# Patient Record
Sex: FEMALE | Race: WHITE | NOT HISPANIC OR LATINO | Employment: OTHER | URBAN - NONMETROPOLITAN AREA
[De-identification: names, ages, dates, MRNs, and addresses within clinical notes are randomized per-mention and may not be internally consistent; named-entity substitution may affect disease eponyms.]

---

## 2023-05-01 PROBLEM — R53.83 FATIGUE: Status: ACTIVE | Noted: 2023-05-01

## 2023-05-01 PROBLEM — M54.12 CERVICAL RADICULITIS: Status: ACTIVE | Noted: 2023-05-01

## 2023-05-01 PROBLEM — E78.5 HYPERLIPIDEMIA: Status: ACTIVE | Noted: 2023-05-01

## 2023-05-01 PROBLEM — R91.1 LUNG NODULE: Status: ACTIVE | Noted: 2023-05-01

## 2023-05-01 RX ORDER — NIACINAMIDE 500 MG
TABLET, EXTENDED RELEASE ORAL
COMMUNITY
End: 2023-10-23

## 2023-05-01 RX ORDER — VITAMIN E MIXED 400 UNIT
CAPSULE ORAL
COMMUNITY
Start: 2021-07-23

## 2023-05-01 RX ORDER — ACETAMINOPHEN 500 MG
TABLET ORAL
COMMUNITY
End: 2023-12-04 | Stop reason: WASHOUT

## 2023-05-01 RX ORDER — BUTYROSPERMUM PARKII(SHEA BUTTER), SIMMONDSIA CHINENSIS (JOJOBA) SEED OIL, ALOE BARBADENSIS LEAF EXTRACT .01; 1; 3.5 G/100G; G/100G; G/100G
LIQUID TOPICAL
COMMUNITY
End: 2023-05-03 | Stop reason: ALTCHOICE

## 2023-05-02 ASSESSMENT — PROMIS GLOBAL HEALTH SCALE
RATE_PHYSICAL_HEALTH: GOOD
RATE_GENERAL_HEALTH: VERY GOOD
RATE_AVERAGE_PAIN: 0
EMOTIONAL_PROBLEMS: RARELY
RATE_QUALITY_OF_LIFE: VERY GOOD
CARRYOUT_PHYSICAL_ACTIVITIES: COMPLETELY
RATE_SOCIAL_SATISFACTION: VERY GOOD
CARRYOUT_SOCIAL_ACTIVITIES: VERY GOOD
RATE_MENTAL_HEALTH: VERY GOOD
RATE_AVERAGE_FATIGUE: MODERATE

## 2023-05-03 ENCOUNTER — OFFICE VISIT (OUTPATIENT)
Dept: PRIMARY CARE | Facility: CLINIC | Age: 58
End: 2023-05-03
Payer: COMMERCIAL

## 2023-05-03 VITALS
SYSTOLIC BLOOD PRESSURE: 105 MMHG | BODY MASS INDEX: 23.58 KG/M2 | RESPIRATION RATE: 12 BRPM | DIASTOLIC BLOOD PRESSURE: 77 MMHG | TEMPERATURE: 97.8 F | OXYGEN SATURATION: 97 % | HEART RATE: 68 BPM | WEIGHT: 133.1 LBS | HEIGHT: 63 IN

## 2023-05-03 DIAGNOSIS — Z11.59 NEED FOR HEPATITIS B SCREENING TEST: ICD-10-CM

## 2023-05-03 DIAGNOSIS — Z00.00 WELLNESS EXAMINATION: ICD-10-CM

## 2023-05-03 DIAGNOSIS — E78.00 ELEVATED LDL CHOLESTEROL LEVEL: Primary | ICD-10-CM

## 2023-05-03 PROBLEM — Z87.891 HISTORY OF PRIOR CIGARETTE SMOKING: Status: ACTIVE | Noted: 2023-05-03

## 2023-05-03 PROCEDURE — 99396 PREV VISIT EST AGE 40-64: CPT | Performed by: FAMILY MEDICINE

## 2023-05-03 PROCEDURE — 1036F TOBACCO NON-USER: CPT | Performed by: FAMILY MEDICINE

## 2023-05-03 NOTE — ASSESSMENT & PLAN NOTE
Currently managing with lifestyle measures.  Ordered lipid panel.  Expectation that cholesterol levels have improved with recent weight loss efforts.

## 2023-05-03 NOTE — PROGRESS NOTES
"Subjective   Patient ID: Donna Alcantara is a 57 y.o. female who presents for annual physical/wellness visit.    She signed document informing that if problem issues also address at today's wellness visit that insurance may be appropriately billed so co-pay and deductible out of pocket expenses may occur.    Colorectal cancer screen: Due at 59  Mammogram: 11/28/2022  Pap:03/21/2022   Last Menstrual Period: unknown   Tdap: 05/29/2018   HIV screen:  Hepatitis C screen: 03/21/2017  Hepatitis B vaccine:    HPI   Patient has been losing weight through diet and exercise. Her goal weight Is 125 lbs. Patient repots having episodes of lightheadedness during light exercise such as walking. During these episdoes her HR would spike to high 100s BPM, before shortly returning to normal HR. At the time she was having episodes about twice per week. She has not had an episode like this for the past 6 months. Patient denies any change to caffeine consumption.    Patient follows with optometrist and dentist regularly. She wears contacts. Denies hearing loss.     Patient quit smoking in 2005.    Review of Systems  constitutional: as per HPI  eyes:as per HPI  CV:as per HPI  Respiratory:as per HPI  GI:as per HPI  neuro:as per HPI  endo:as per HPI  heme/lymph:as per HPI     Objective   /77 (BP Location: Right arm, Patient Position: Sitting, BP Cuff Size: Adult)   Pulse 68   Temp 36.6 °C (97.8 °F) (Temporal)   Resp 12   Ht 1.6 m (5' 3\")   Wt 60.4 kg (133 lb 1.6 oz)   LMP  (LMP Unknown)   SpO2 97%   BMI 23.58 kg/m²     Physical Exam  Constitutional:       Appearance: Normal appearance. She is normal weight.   HENT:      Head: Normocephalic.      Right Ear: Tympanic membrane, ear canal and external ear normal.      Left Ear: Tympanic membrane, ear canal and external ear normal.   Eyes:      Conjunctiva/sclera: Conjunctivae normal.      Pupils: Pupils are equal, round, and reactive to light.   Cardiovascular:      Rate and Rhythm: " "Normal rate and regular rhythm.   Pulmonary:      Effort: Pulmonary effort is normal.      Breath sounds: Normal breath sounds.   Abdominal:      General: Abdomen is flat. Bowel sounds are normal.      Palpations: Abdomen is soft.   Musculoskeletal:         General: Normal range of motion.   Skin:     General: Skin is warm.   Neurological:      Mental Status: She is alert and oriented to person, place, and time.   Psychiatric:         Mood and Affect: Mood normal.         Behavior: Behavior normal.         Thought Content: Thought content normal.         Judgment: Judgment normal.         Assessment/Plan   Problem List Items Addressed This Visit          Other    Elevated LDL cholesterol level - Primary     Currently managing with lifestyle measures.  Ordered lipid panel.  Expectation that cholesterol levels have improved with recent weight loss efforts.          Other Visit Diagnoses       Wellness examination        Colonoscopy up to date (due at age 59).  Pap is up to date (due in 2025).  Ordered general wellness blood work.    Relevant Orders    CBC    Lipid Panel    Comprehensive Metabolic Panel    Need for hepatitis B screening test        Relevant Orders    Hepatitis B surface antibody          Aim for 1500 mg of calcium daily through diet. Avoid calcium supplementation due to increase cardiovascular risk.    Tips for your general wellness...;  Remember importance of daily aerobic exercise for 30minutes to help with both your physical and mental/emotional health.  ;  Take time twice a day to relax with focused breathing and relaxation.  A good source to learn \"mindfulness\" relaxation is a book \"Mindfulness for Beginners\" by Lan Sumner.  ;    Strive for healthy eating with plenty of water, fruits, vegetables, and choosing as much plant based diet as able  If do consume non plant protein, choose fish high in omega (like salmon or cod), skinless poultry, and rarely anything from a cow  Avoid processed " foods.  ;  Shop the perimeter of the grocery store  - not the inner aisles where all the boxed, bagged, and canned process non natural foods are   Avoid sugar - including fruit juices with added sugar and avoid artificial sweeteners (sucralose, aspartame).; if want to use sweetener, use stevia (natural plant based non caloric sweetener)  Recommended guided nutrition plans include Mediterranean Diet - online resources available     Get plenty of sleep nightly - 7 hours minimum;    Exercise 150min per week;    Do not use tobacco    Abstain or limit alcohol to 1-2 drinks per 24 hours    See your dentist at least yearly    Have an eye check at least every 5 years    Follow up 1 year for annual wellness checkup;    By signing my name below I, ashley Crook, attest that this documentation has been prepared under the direction and in the presence of Dr. Brandan Kaplan. 05/03/23

## 2023-05-04 ENCOUNTER — LAB (OUTPATIENT)
Dept: LAB | Facility: LAB | Age: 58
End: 2023-05-04
Payer: COMMERCIAL

## 2023-05-04 DIAGNOSIS — Z11.59 NEED FOR HEPATITIS B SCREENING TEST: ICD-10-CM

## 2023-05-04 DIAGNOSIS — Z00.00 WELLNESS EXAMINATION: ICD-10-CM

## 2023-05-04 LAB
ALANINE AMINOTRANSFERASE (SGPT) (U/L) IN SER/PLAS: 15 U/L (ref 7–45)
ALBUMIN (G/DL) IN SER/PLAS: 4.3 G/DL (ref 3.4–5)
ALKALINE PHOSPHATASE (U/L) IN SER/PLAS: 71 U/L (ref 33–110)
ANION GAP IN SER/PLAS: 12 MMOL/L (ref 10–20)
ASPARTATE AMINOTRANSFERASE (SGOT) (U/L) IN SER/PLAS: 14 U/L (ref 9–39)
BILIRUBIN TOTAL (MG/DL) IN SER/PLAS: 0.3 MG/DL (ref 0–1.2)
CALCIUM (MG/DL) IN SER/PLAS: 9.7 MG/DL (ref 8.6–10.6)
CARBON DIOXIDE, TOTAL (MMOL/L) IN SER/PLAS: 29 MMOL/L (ref 21–32)
CHLORIDE (MMOL/L) IN SER/PLAS: 105 MMOL/L (ref 98–107)
CHOLESTEROL (MG/DL) IN SER/PLAS: 222 MG/DL (ref 0–199)
CHOLESTEROL IN HDL (MG/DL) IN SER/PLAS: 58.1 MG/DL
CHOLESTEROL/HDL RATIO: 3.8
CREATININE (MG/DL) IN SER/PLAS: 0.69 MG/DL (ref 0.5–1.05)
ERYTHROCYTE DISTRIBUTION WIDTH (RATIO) BY AUTOMATED COUNT: 14.2 % (ref 11.5–14.5)
ERYTHROCYTE MEAN CORPUSCULAR HEMOGLOBIN CONCENTRATION (G/DL) BY AUTOMATED: 31.5 G/DL (ref 32–36)
ERYTHROCYTE MEAN CORPUSCULAR VOLUME (FL) BY AUTOMATED COUNT: 89 FL (ref 80–100)
ERYTHROCYTES (10*6/UL) IN BLOOD BY AUTOMATED COUNT: 4.65 X10E12/L (ref 4–5.2)
GFR FEMALE: >90 ML/MIN/1.73M2
GLUCOSE (MG/DL) IN SER/PLAS: 89 MG/DL (ref 74–99)
HEMATOCRIT (%) IN BLOOD BY AUTOMATED COUNT: 41.3 % (ref 36–46)
HEMOGLOBIN (G/DL) IN BLOOD: 13 G/DL (ref 12–16)
HEPATITIS B VIRUS SURFACE AB (MIU/ML) IN SERUM: 23.5 MIU/ML
LDL: 147 MG/DL (ref 0–99)
LEUKOCYTES (10*3/UL) IN BLOOD BY AUTOMATED COUNT: 5.2 X10E9/L (ref 4.4–11.3)
NRBC (PER 100 WBCS) BY AUTOMATED COUNT: 0 /100 WBC (ref 0–0)
PLATELETS (10*3/UL) IN BLOOD AUTOMATED COUNT: 275 X10E9/L (ref 150–450)
POTASSIUM (MMOL/L) IN SER/PLAS: 4.9 MMOL/L (ref 3.5–5.3)
PROTEIN TOTAL: 6.5 G/DL (ref 6.4–8.2)
SODIUM (MMOL/L) IN SER/PLAS: 141 MMOL/L (ref 136–145)
TRIGLYCERIDE (MG/DL) IN SER/PLAS: 83 MG/DL (ref 0–149)
UREA NITROGEN (MG/DL) IN SER/PLAS: 15 MG/DL (ref 6–23)
VLDL: 17 MG/DL (ref 0–40)

## 2023-05-04 PROCEDURE — 86706 HEP B SURFACE ANTIBODY: CPT

## 2023-05-04 PROCEDURE — 80061 LIPID PANEL: CPT

## 2023-05-04 PROCEDURE — 80053 COMPREHEN METABOLIC PANEL: CPT

## 2023-05-04 PROCEDURE — 85027 COMPLETE CBC AUTOMATED: CPT

## 2023-05-04 PROCEDURE — 36415 COLL VENOUS BLD VENIPUNCTURE: CPT

## 2023-10-23 ENCOUNTER — OFFICE VISIT (OUTPATIENT)
Dept: OBSTETRICS AND GYNECOLOGY | Facility: CLINIC | Age: 58
End: 2023-10-23
Payer: COMMERCIAL

## 2023-10-23 VITALS
DIASTOLIC BLOOD PRESSURE: 74 MMHG | SYSTOLIC BLOOD PRESSURE: 121 MMHG | HEIGHT: 63 IN | BODY MASS INDEX: 24.27 KG/M2 | WEIGHT: 137 LBS

## 2023-10-23 DIAGNOSIS — Z01.419 WELL WOMAN EXAM: Primary | ICD-10-CM

## 2023-10-23 PROCEDURE — 99396 PREV VISIT EST AGE 40-64: CPT | Performed by: NURSE PRACTITIONER

## 2023-10-23 PROCEDURE — 1036F TOBACCO NON-USER: CPT | Performed by: NURSE PRACTITIONER

## 2023-10-23 ASSESSMENT — ENCOUNTER SYMPTOMS
GASTROINTESTINAL NEGATIVE: 1
CONSTITUTIONAL NEGATIVE: 1
EYES NEGATIVE: 1
NEUROLOGICAL NEGATIVE: 1
CARDIOVASCULAR NEGATIVE: 1
RESPIRATORY NEGATIVE: 1
ENDOCRINE NEGATIVE: 1
HEMATOLOGIC/LYMPHATIC NEGATIVE: 1
PSYCHIATRIC NEGATIVE: 1
ALLERGIC/IMMUNOLOGIC NEGATIVE: 1
MUSCULOSKELETAL NEGATIVE: 1

## 2023-10-23 ASSESSMENT — PAIN SCALES - GENERAL: PAINLEVEL: 0-NO PAIN
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
## 2023-11-29 NOTE — PROGRESS NOTES
Donna Alcantara female   1965 58 y.o.   [id number]      Chief Complaint  High risk evaluation    History Of Present Illness  Donna Alcantara is a very pleasant 58 year old  woman [facility]. She has family history of breast cancer in her mother, age 53, BRCA negative, sister, age 56 and paternal aunt. She had a bilateral breast reduction and lift in 2022, recovered well. She is pleased with the outcome. She has a history of right breast core biopsy, benign in 2018 and history of left breast core biopsy in 2023, benign and concordant. The biopsy in September was prompted by MRI, short term MRI follow up recommended.      BREAST IMAGIN2022 Bilateral screening mammogram, indicates BI-RADS Category 1.      FEMALE HISTORY: menarche age 13, nulliparous, OCP's x 35 years, natural menopause age 49, no HRT, scattered fibroglandular tissue     FAMILY CANCER HISTORY:  Mother: Breast cancer, age 53, negative genetics  Sister: Breast cancer, age 56, NO genetics   Paternal Aunt (1): Breast cancer  Paternal Aunt (2): Ovarian cancer     Surgical History  She has a past surgical history that includes Tonsillectomy (2017); Appendectomy (2017); Breast surgery (2022); Bland tooth extraction; Skin biopsy (Right); Breast biopsy (2023); and Reduction mammaplasty (12).     Social History  She reports that she quit smoking about 18 years ago. Her smoking use included cigarettes. She has a 15.00 pack-year smoking history. She has never used smokeless tobacco. She reports current alcohol use of about 8.0 standard drinks of alcohol per week. She reports that she does not use drugs.    Family History  Family History   Problem Relation Name Age of Onset    Hypertension Mother Ramandeep     Breast cancer Mother Ramandeep     Uterine cancer Mother Ramandeep     Osteoporosis Mother Ramandeep     Arthritis Mother Ramandeep     Breast cancer Sister Pauline hernandez      Alcohol abuse Mother's Brother Michael     Colon cancer Mother's Brother Michael     Diabetes Maternal Grandmother Grandma     Breast cancer Maternal Cousin          Allergies  Erythromycin base    Medications  Current Outpatient Medications   Medication Instructions    calcium carbonate (CALCIUM 500 ORAL) oral    cholecalciferol (Vitamin D-3) 50 mcg (2,000 unit) capsule oral    glucosam-chondroitin-diet cb25 116-100 mg capsule oral    vitamin E 180 mg (400 unit) capsule oral, Daily RT         REVIEW OF SYSTEMS    Constitutional:  Negative for appetite change, fatigue, fever and unexpected weight change.   HENT:  Negative for ear pain, hearing loss, nosebleeds, sore throat and trouble swallowing.    Eyes:  Negative for discharge, itching and visual disturbance.   Respiratory:  Negative for cough, chest tightness and shortness of breath.    Cardiovascular:  Negative for chest pain, palpitations and leg swelling.   Breast: as indicated in HPI  Gastrointestinal:  Negative for abdominal pain, constipation, diarrhea and nausea.   Endocrine: Negative for cold intolerance and heat intolerance.   Genitourinary:  Negative for dysuria, frequency, hematuria, pelvic pain and vaginal bleeding.   Musculoskeletal:  Negative for arthralgias, back pain, gait problem, joint swelling and myalgias.   Skin:  Negative for color change and rash.   Allergic/Immunologic: Negative for environmental allergies and food allergies.   Neurological:  Negative for dizziness, tremors, speech difficulty, weakness, numbness and headaches.   Hematological:  Does not bruise/bleed easily.   Psychiatric/Behavioral:  Negative for agitation, dysphoric mood and sleep disturbance. The patient is not nervous/anxious.         Past Medical History  She has a past medical history of Abnormal findings on diagnostic imaging of other specified body structures (04/16/2020), Acute eczematoid otitis externa, unspecified ear (12/03/2018), Allergic, Anemia, Arthritis,  Contact with and (suspected) exposure to covid-19 (12/08/2020), Encounter for immunization (03/29/2018), Encounter for immunization (12/26/2019), Encounter for other screening for malignant neoplasm of breast (05/29/2018), Encounter for screening for malignant neoplasm of cervix (05/29/2018), Encounter for screening for other viral diseases, Fibrocystic breast, Low back pain, unspecified (07/23/2021), Other abnormal and inconclusive findings on diagnostic imaging of breast (12/05/2018), Other specified disorders of eustachian tube, right ear (12/03/2018), Other specified disorders of nose and nasal sinuses (03/22/2017), Other specified noninflammatory disorders of vagina (06/20/2019), Pain in right shoulder (11/12/2020), Peptic ulceration, Personal history of other benign neoplasm (03/21/2017), Personal history of other diseases of the musculoskeletal system and connective tissue (03/21/2017), Personal history of other diseases of the musculoskeletal system and connective tissue (10/16/2019), Personal history of other diseases of urinary system (01/17/2020), Personal history of other medical treatment (07/10/2019), Personal history of other specified conditions (02/15/2021), Personal history of other specified conditions (07/23/2021), Personal history of other specified conditions (03/29/2018), Personal history of other specified conditions (01/23/2020), Personal history of pneumonia (recurrent) (12/26/2019), Personal history of urinary (tract) infections (11/02/2017), Postmenopausal atrophic vaginitis (03/31/2022), Psoriasis, unspecified (03/21/2017), Sprain of left acromioclavicular joint, initial encounter (01/17/2020), Unspecified injury of muscle(s) and tendon(s) of the rotator cuff of right shoulder, initial encounter (09/29/2020), Unspecified sprain of unspecified finger, initial encounter (01/17/2020), and Varicella.     Physical Exam  Patient is alert and oriented x3 and in a relaxed and appropriate mood.  Her gait is steady and hand grasps are equal. Sclera is clear. The breasts are nearly symmetrical. The tissue is dense in the superior lateral quadrants and softer below without palpable abnormalities, discrete nodules or masses. Both breasts have well-healed lollipop incisions. The skin and nipples appear normal. There is no cervical, supraclavicular or axillary lymphadenopathy. Heart rate and rhythm normal, S1 and S2 appreciated. The lungs are clear to auscultation bilaterally. Abdomen is soft and non-tender.       Physical Exam  Chest:              Last Recorded Vitals  Vitals:    12/04/23 1134   BP: 125/86   Pulse: 68       Relevant Results   Time was spent viewing digital images of the radiology testing with the patient. I explained the results in depth, along with suggested explanation for follow up recommendations based on the testing results. BI-RADS Category     Imaging     Narrative & Impression   Interpreted By:  Chyna Krause and Avery Ross   STUDY:  BI MAMMO BILATERAL SCREENING TOMOSYNTHESIS;  12/4/2023 11:10 am      ACCESSION NUMBER(S):  [id number]      ORDERING CLINICIAN:  ARIEL MA      INDICATION:  Screening. Bilateral reduction. Benign bilateral breast biopsies.  Strong family history of breast cancer.      COMPARISON:  09/06/2023, 11/28/2022, 11/01/2021, 10/15/2020.      FINDINGS:  2D and tomosynthesis images were reviewed at 1 mm slice thickness.      Density:  There are areas of scattered fibroglandular tissue.      Tissue markers are identified in the inferolateral right breast at  posterior depth and superolateral left breast at middle depth.  Bilateral reduction changes are noted. No suspicious masses or  calcifications are identified.      IMPRESSION:  No mammographic evidence of malignancy.      BI-RADS CATEGORY:  BI-RADS Category:  2 Benign.  Recommendation:  Routine Screening Mammogram in 1 Year.  Recommended Date:  1 Year.  Laterality:  Bilateral.       Assessment/Plan    High risk surveillance care, normal clinical exam and imaging, family history of breast cancer, scattered fibroglandular tissue, history of bilateral core biopsies, benign, history of bilateral breast reduction and lift    Plan: Return in one year for bilateral screening mammogram and office visit. Full MRI in March 2024. Endocrine therapy discussed and recommended. She would like to consider her options and contact office if decided.     Patient Discussion/Summary  Your clinical examination and imaging are normal. Please return in one year for bilateral screening mammogram and office visit or sooner if you have any problems or concerns.     High risk breast surveillance care plan:  Yearly mammogram with digital breast tomosynthesis  Twice yearly clinical breast examinations  Breast MRI- Full MRI in March 2024  Monthly self breast examinations  Vitamin D3 2000 IU/daily (over the counter)  Exercise 3-4 times per week for 45-60 minutes  Limit alcohol to 3-4 drinks per week   Eat a healthy low-fat diet with lots of fruits and vegetables  Risk models indicate personal risk of breast cancer in the next five years and lifetime (age 90)  Breast Cancer Risk Assessment Tool (Una): 5 year risk 6.3% (average 1.7%) and lifetime risk 31.4% (average 9.5%)  Gita-Mark Anthonyck: 5 year risk 3.1% (average 1.6%) and lifetime risk 14.9% (average 7.7%)   Risk model indicates you are eligible for endocrine therapy with Tamoxifen, Raloxifene or Exemestane. Endocrine therapy reduces lifetime risk of breast cancer by 50% when taken for 5 years.    You can see your health information, review clinical summaries from office visits & test results online when you follow your health with MY  Chart, a personal health record. To sign up go to [email or web address]. If you need assistance with signing up or trouble getting into your account call [facility] Patient Line 24/7 at [phone].    My office phone number is [phone] if you need to  get in touch with me or have additional questions or concerns. Thank you for choosing [facility] and trusting me as your healthcare provider. I look forward to seeing you again at your next office visit. I am honored to be a provider on your health care team and I remain dedicated to helping you achieve your health goals.      Krystal Parham, APRN-CNP

## 2023-12-04 ENCOUNTER — ANCILLARY PROCEDURE (OUTPATIENT)
Dept: RADIOLOGY | Facility: CLINIC | Age: 58
End: 2023-12-04
Payer: COMMERCIAL

## 2023-12-04 ENCOUNTER — OFFICE VISIT (OUTPATIENT)
Dept: SURGICAL ONCOLOGY | Facility: CLINIC | Age: 58
End: 2023-12-04
Payer: COMMERCIAL

## 2023-12-04 VITALS
WEIGHT: 130 LBS | DIASTOLIC BLOOD PRESSURE: 86 MMHG | HEART RATE: 68 BPM | SYSTOLIC BLOOD PRESSURE: 125 MMHG | BODY MASS INDEX: 23.04 KG/M2 | HEIGHT: 63 IN

## 2023-12-04 DIAGNOSIS — Z12.31 ENCOUNTER FOR SCREENING MAMMOGRAM FOR MALIGNANT NEOPLASM OF BREAST: ICD-10-CM

## 2023-12-04 DIAGNOSIS — Z12.39 BREAST CANCER SCREENING, HIGH RISK PATIENT: Primary | ICD-10-CM

## 2023-12-04 PROCEDURE — 1036F TOBACCO NON-USER: CPT | Performed by: NURSE PRACTITIONER

## 2023-12-04 PROCEDURE — 77063 BREAST TOMOSYNTHESIS BI: CPT | Mod: BILATERAL PROCEDURE | Performed by: STUDENT IN AN ORGANIZED HEALTH CARE EDUCATION/TRAINING PROGRAM

## 2023-12-04 PROCEDURE — 99214 OFFICE O/P EST MOD 30 MIN: CPT | Performed by: NURSE PRACTITIONER

## 2023-12-04 PROCEDURE — 77067 SCR MAMMO BI INCL CAD: CPT | Mod: BILATERAL PROCEDURE | Performed by: STUDENT IN AN ORGANIZED HEALTH CARE EDUCATION/TRAINING PROGRAM

## 2023-12-04 PROCEDURE — 77063 BREAST TOMOSYNTHESIS BI: CPT

## 2023-12-04 ASSESSMENT — PAIN SCALES - GENERAL: PAINLEVEL: 0-NO PAIN

## 2023-12-04 NOTE — PATIENT INSTRUCTIONS
Your clinical examination and imaging are normal. Please return in one year for bilateral screening mammogram and office visit or sooner if you have any problems or concerns.     High risk breast surveillance care plan:  Yearly mammogram with digital breast tomosynthesis  Twice yearly clinical breast examinations  Breast MRI- Full MRI in March 2024  Monthly self breast examinations  Vitamin D3 2000 IU/daily (over the counter)  Exercise 3-4 times per week for 45-60 minutes  Limit alcohol to 3-4 drinks per week   Eat a healthy low-fat diet with lots of fruits and vegetables  Risk models indicate personal risk of breast cancer in the next five years and lifetime (age 90)  Breast Cancer Risk Assessment Tool (Una): 5 year risk 6.3% (average 1.7%) and lifetime risk 31.4% (average 9.5%)  Chucker-Diane: 5 year risk 3.1% (average 1.6%) and lifetime risk 14.9% (average 7.7%)   Risk model indicates you are eligible for endocrine therapy with Tamoxifen, Raloxifene or Exemestane. Endocrine therapy reduces lifetime risk of breast cancer by 50% when taken for 5 years.    You can see your health information, review clinical summaries from office visits & test results online when you follow your health with MY  Chart, a personal health record. To sign up go to [email or web address]. If you need assistance with signing up or trouble getting into your account call [employer] Patient Line 24/7 at [phone].    My office phone number is [phone] if you need to get in touch with me or have additional questions or concerns. Thank you for choosing [facility] and trusting me as your healthcare provider. I look forward to seeing you again at your next office visit. I am honored to be a provider on your health care team and I remain dedicated to helping you achieve your health goals.

## 2024-01-17 ENCOUNTER — OFFICE VISIT (OUTPATIENT)
Dept: PRIMARY CARE | Facility: CLINIC | Age: 59
End: 2024-01-17
Payer: COMMERCIAL

## 2024-01-17 VITALS
OXYGEN SATURATION: 96 % | HEART RATE: 73 BPM | BODY MASS INDEX: 24.09 KG/M2 | DIASTOLIC BLOOD PRESSURE: 75 MMHG | WEIGHT: 136 LBS | TEMPERATURE: 97.3 F | SYSTOLIC BLOOD PRESSURE: 113 MMHG

## 2024-01-17 DIAGNOSIS — R07.81 RIB PAIN ON LEFT SIDE: Primary | ICD-10-CM

## 2024-01-17 PROCEDURE — 99213 OFFICE O/P EST LOW 20 MIN: CPT | Performed by: FAMILY MEDICINE

## 2024-01-17 PROCEDURE — 1036F TOBACCO NON-USER: CPT | Performed by: FAMILY MEDICINE

## 2024-01-17 NOTE — PROGRESS NOTES
Subjective   Patient ID: Donna Alcantara is a 58 y.o. female who presents for Mass (Lump on left side below ribs. Posterior. Pt notice 3 days ago, and feels like it has grown since then. Doesn't cause pt any pain at this time. Pt has history of cysts, but states that this is harder than any other cyst she has had ).  HPI    Was applying lotion to her side and noticed lump in back .  Firm .  Wondering if it is a cyst.  Not otherwise sxic     Remote smoker   No kidney pain. No uti sx.s no fever, no illness. Has had a slight cough from sinus drng, but not harsh coughing.     No trauma, falls, or other injuries   Review of Systems    Objective   /75 (BP Location: Right arm, Patient Position: Sitting, BP Cuff Size: Adult)   Pulse 73   Temp 36.3 °C (97.3 °F) (Temporal)   Wt 61.7 kg (136 lb)   LMP  (LMP Unknown)   SpO2 96%   BMI 24.09 kg/m²     Physical Exam  Musculoskeletal:        Back:        Alert. No distress    Lungs clear  CV regular  No CVA pain     No distinct cyst or mass . She has pain on palpation of left back   No crepitus/ step off.   With light touch, there is a depression in the soft tissue and just adjacent to that is a firmness.  She indicates the firmness as the 'lump'  The depression is the intercostal space and the firmness/ lump is  a rib .   Assessment/Plan   Problem List Items Addressed This Visit    None  Visit Diagnoses       Rib pain on left side    -  Primary    Relevant Orders    XR ribs 2 views left w chest pa or ap          Palpable lump = rib, lower/ floating.  Mild inflammation of rib , unclear reason .    Reassurance.    It is normal that it may be sore from exam today/ repeat checking/ palpation.   Recommend  avoid pushing/ pressing. If not gone by next week, get xray.     MOHIT Hickey MD

## 2024-07-17 ENCOUNTER — TELEPHONE (OUTPATIENT)
Dept: SURGICAL ONCOLOGY | Facility: HOSPITAL | Age: 59
End: 2024-07-17
Payer: COMMERCIAL

## 2024-07-17 NOTE — TELEPHONE ENCOUNTER
Pt called wanting a Fast MRI put in due to her $6000/deductible.  Instead of the Full MRI.  Order entered.

## 2024-07-23 ENCOUNTER — HOSPITAL ENCOUNTER (OUTPATIENT)
Dept: RADIOLOGY | Facility: HOSPITAL | Age: 59
Discharge: HOME | End: 2024-07-23

## 2024-07-23 DIAGNOSIS — Z91.89 AT HIGH RISK FOR BREAST CANCER: ICD-10-CM

## 2024-07-23 PROCEDURE — 2550000001 HC RX 255 CONTRASTS: Performed by: NURSE PRACTITIONER

## 2024-07-23 PROCEDURE — 6100000003 BI MR BREAST BILATERAL WITH CONTRAST FAST SCREENING SELF PAY

## 2024-07-23 PROCEDURE — A9575 INJ GADOTERATE MEGLUMI 0.1ML: HCPCS | Performed by: NURSE PRACTITIONER

## 2024-07-23 RX ORDER — GADOTERATE MEGLUMINE 376.9 MG/ML
20 INJECTION INTRAVENOUS
Status: COMPLETED | OUTPATIENT
Start: 2024-07-23 | End: 2024-07-23

## 2024-07-31 ENCOUNTER — APPOINTMENT (OUTPATIENT)
Dept: PRIMARY CARE | Facility: CLINIC | Age: 59
End: 2024-07-31
Payer: COMMERCIAL

## 2024-07-31 VITALS
OXYGEN SATURATION: 96 % | SYSTOLIC BLOOD PRESSURE: 99 MMHG | TEMPERATURE: 96.8 F | BODY MASS INDEX: 24.69 KG/M2 | WEIGHT: 139.4 LBS | DIASTOLIC BLOOD PRESSURE: 63 MMHG | HEART RATE: 72 BPM

## 2024-07-31 DIAGNOSIS — Z00.00 WELLNESS EXAMINATION: ICD-10-CM

## 2024-07-31 DIAGNOSIS — K29.00 ACUTE GASTRITIS WITHOUT HEMORRHAGE, UNSPECIFIED GASTRITIS TYPE: ICD-10-CM

## 2024-07-31 DIAGNOSIS — E78.00 ELEVATED LDL CHOLESTEROL LEVEL: Primary | ICD-10-CM

## 2024-07-31 DIAGNOSIS — Z12.12 SCREENING FOR COLORECTAL CANCER: ICD-10-CM

## 2024-07-31 DIAGNOSIS — Z12.11 SCREENING FOR COLORECTAL CANCER: ICD-10-CM

## 2024-07-31 DIAGNOSIS — E55.9 VITAMIN D DEFICIENCY: ICD-10-CM

## 2024-07-31 PROBLEM — R50.9 FEVER: Status: ACTIVE | Noted: 2019-12-24

## 2024-07-31 PROBLEM — R07.9 CHEST PAIN: Status: RESOLVED | Noted: 2019-12-24 | Resolved: 2024-07-31

## 2024-07-31 PROBLEM — R92.8 ABNORMAL MRI, BREAST: Status: ACTIVE | Noted: 2024-07-31

## 2024-07-31 PROBLEM — E78.5 HYPERLIPIDEMIA: Status: RESOLVED | Noted: 2024-07-31 | Resolved: 2024-07-31

## 2024-07-31 PROBLEM — M54.12 CERVICAL RADICULITIS: Status: RESOLVED | Noted: 2023-05-01 | Resolved: 2024-07-31

## 2024-07-31 PROBLEM — J18.9 PNEUMONIA: Status: RESOLVED | Noted: 2019-12-24 | Resolved: 2024-07-31

## 2024-07-31 PROBLEM — E78.5 HYPERLIPIDEMIA: Status: ACTIVE | Noted: 2024-07-31

## 2024-07-31 PROBLEM — R10.13 EPIGASTRIC PAIN: Status: RESOLVED | Noted: 2024-07-31 | Resolved: 2024-07-31

## 2024-07-31 PROBLEM — J18.9 PNEUMONIA: Status: ACTIVE | Noted: 2019-12-24

## 2024-07-31 PROBLEM — R53.83 FATIGUE: Status: RESOLVED | Noted: 2023-05-01 | Resolved: 2024-07-31

## 2024-07-31 PROBLEM — R50.9 FEVER: Status: RESOLVED | Noted: 2019-12-24 | Resolved: 2024-07-31

## 2024-07-31 PROBLEM — R07.9 CHEST PAIN: Status: ACTIVE | Noted: 2019-12-24

## 2024-07-31 PROBLEM — R10.13 EPIGASTRIC PAIN: Status: ACTIVE | Noted: 2024-07-31

## 2024-07-31 PROCEDURE — 99396 PREV VISIT EST AGE 40-64: CPT | Performed by: FAMILY MEDICINE

## 2024-07-31 RX ORDER — NITROFURANTOIN 25; 75 MG/1; MG/1
1 CAPSULE ORAL
COMMUNITY
Start: 2024-07-09 | End: 2024-07-31 | Stop reason: WASHOUT

## 2024-07-31 RX ORDER — SULFAMETHOXAZOLE AND TRIMETHOPRIM 800; 160 MG/1; MG/1
1 TABLET ORAL
COMMUNITY
Start: 2024-07-05 | End: 2024-07-31 | Stop reason: WASHOUT

## 2024-07-31 RX ORDER — PANTOPRAZOLE SODIUM 40 MG/1
40 TABLET, DELAYED RELEASE ORAL DAILY
Qty: 30 TABLET | Refills: 0 | Status: SHIPPED | OUTPATIENT
Start: 2024-07-31 | End: 2024-09-29

## 2024-07-31 ASSESSMENT — PATIENT HEALTH QUESTIONNAIRE - PHQ9
1. LITTLE INTEREST OR PLEASURE IN DOING THINGS: NOT AT ALL
2. FEELING DOWN, DEPRESSED OR HOPELESS: NOT AT ALL
SUM OF ALL RESPONSES TO PHQ9 QUESTIONS 1 AND 2: 0

## 2024-07-31 ASSESSMENT — ENCOUNTER SYMPTOMS
WHEEZING: 0
FREQUENCY: 0
SHORTNESS OF BREATH: 0
ROS GI COMMENTS: OCCASIONAL REFLUX
CONSTIPATION: 0
PALPITATIONS: 0
NAUSEA: 0
DYSURIA: 0
DIARRHEA: 0
ABDOMINAL PAIN: 0

## 2024-07-31 NOTE — PROGRESS NOTES
Subjective   Patient ID: Donna Alcantara is a 58 y.o. female who presents for Annual Exam.    She has a very strong family history of breast cancer, including her mother, sister, and aunts. Her mother also had uterine cancer and she had an aunt with ovarian cancer. She had an MRI recently after an abnormal mammogram, it was completely normal with no malignancy found. She plans to continue getting the MRI's annually, even if she has to pay out of pocket due to being high risk. She also worries about colon cancer and would like to have a colonoscopy soon.  She has flares of reflux every so often, she states that the last episode was about three months ago. Normally she can resolve it with OTC antacids but the most recent case did seem to last a bit longer than normal. She does consume alcohol on a regular basis and enjoys spicy foods. She does not use Advil or Ibuprofen often.   She visits with an eye doctor and dentist regularly.          Review of Systems   Respiratory:  Negative for shortness of breath and wheezing.    Cardiovascular:  Negative for chest pain and palpitations.   Gastrointestinal:  Negative for abdominal pain, constipation, diarrhea and nausea.        Occasional reflux   Genitourinary:  Negative for dysuria, frequency and urgency.       Objective   BP 99/63   Pulse 72   Temp 36 °C (96.8 °F)   Wt 63.2 kg (139 lb 6.4 oz)   LMP  (LMP Unknown)   SpO2 96%   BMI 24.69 kg/m²     Physical Exam  Constitutional:       Appearance: Normal appearance. She is normal weight.   HENT:      Head: Normocephalic.      Right Ear: Tympanic membrane normal.      Left Ear: Tympanic membrane normal.      Nose: Nose normal.      Mouth/Throat:      Pharynx: Oropharynx is clear.   Eyes:      Conjunctiva/sclera: Conjunctivae normal.   Cardiovascular:      Rate and Rhythm: Normal rate and regular rhythm.      Pulses: Normal pulses.      Heart sounds: Normal heart sounds.   Pulmonary:      Effort: Pulmonary effort is normal.     "  Breath sounds: Normal breath sounds.   Abdominal:      General: Abdomen is flat. Bowel sounds are normal.      Palpations: Abdomen is soft.      Tenderness: There is no abdominal tenderness.   Musculoskeletal:      Cervical back: Normal range of motion.   Neurological:      General: No focal deficit present.      Mental Status: She is alert.   Psychiatric:         Mood and Affect: Mood normal.         Behavior: Behavior normal.         Thought Content: Thought content normal.         Judgment: Judgment normal.         Assessment/Plan   Problem List Items Addressed This Visit       Elevated LDL cholesterol level - Primary     Lab Results   Component Value Date    LDLF 147 (H) 05/04/2023    LDLF 131 (H) 04/26/2022    LDLF 151 (H) 01/29/2021   Order placed to recheck level.    Natural efforts to lower LDL cholesterol:  1. Diet should be predominantly plant based (veggies, fruits, healthy fats such as olive oil, nuts, seeds, avocados, proteins from non meat such as quinoa (\"keenwha\"), chickpeas, lentil, soy, tofu - if choose animal source of protein, choose fish first, skinless poultry second, and 'red' meat as least frequent option  2. eat blueberries 1 cup daily  3. use supplement called plant stanol 950mg twice daily with meals (one brand name is \"Cholest-off\"  4. use psyllium husk fiber such as metamucil powder 1 tablespoon in 8 ounces water three times a day before meals - gradually work to this dosing over 3 weeks - starting once daily and every week add another dose          Other Visit Diagnoses       Screening for colorectal cancer        Relevant Orders    Colonoscopy Screening; Average Risk Patient    Wellness examination        Relevant Orders    CBC    Lipid Panel    Comprehensive Metabolic Panel    Vitamin D deficiency        Relevant Orders    Vitamin D 25-Hydroxy,Total (for eval of Vitamin D levels)    Acute gastritis without hemorrhage, unspecified gastritis type        Relevant Medications    " "pantoprazole (ProtoNix) 40 mg EC tablet          Follow up: Schedule in one year    Tips for your general wellness...;  Remember importance of daily aerobic exercise for 30minutes to help with both your physical and mental/emotional health.  ;  Take time twice a day to relax with focused breathing and relaxation.  A good source to learn \"mindfulness\" relaxation is a book \"Mindfulness for Beginners\" by Lan Sumner.  ;    Strive for healthy eating with plenty of water, fruits, vegetables, and choosing as much plant based diet as able  If do consume non plant protein, choose fish high in omega (like salmon or cod), skinless poultry, and rarely anything from a cow  Avoid processed foods.  ;  Shop the perimeter of the grocery store  - not the inner aisles where all the boxed, bagged, and canned process non natural foods are   Avoid sugar - including fruit juices with added sugar and avoid artificial sweeteners (sucralose, aspartame).; if want to use sweetener, use stevia (natural plant based non caloric sweetener)  Recommended guided nutrition plans include Mediterranean Diet - online resources available     Get plenty of sleep nightly - 7 hours minimum;    Exercise 150min per week;    Do not use tobacco    Abstain or limit alcohol to 1-2 drinks per 24 hours    See your dentist at least yearly    Have an eye check at least every 5 years    Follow up 1 year for annual wellness checkup;    Scribe Attestation  By signing my name below, IAmada Scribe   attest that this documentation has been prepared under the direction and in the presence of Brandan Kaplan MD.  "

## 2024-07-31 NOTE — ASSESSMENT & PLAN NOTE
"Lab Results   Component Value Date    LDLF 147 (H) 05/04/2023    LDLF 131 (H) 04/26/2022    LDLF 151 (H) 01/29/2021   Order placed to recheck level.    Natural efforts to lower LDL cholesterol:  1. Diet should be predominantly plant based (veggies, fruits, healthy fats such as olive oil, nuts, seeds, avocados, proteins from non meat such as quinoa (\"keenwha\"), chickpeas, lentil, soy, tofu - if choose animal source of protein, choose fish first, skinless poultry second, and 'red' meat as least frequent option  2. eat blueberries 1 cup daily  3. use supplement called plant stanol 950mg twice daily with meals (one brand name is \"Cholest-off\"  4. use psyllium husk fiber such as metamucil powder 1 tablespoon in 8 ounces water three times a day before meals - gradually work to this dosing over 3 weeks - starting once daily and every week add another dose  "

## 2024-08-02 ENCOUNTER — LAB (OUTPATIENT)
Dept: LAB | Facility: LAB | Age: 59
End: 2024-08-02
Payer: COMMERCIAL

## 2024-08-02 DIAGNOSIS — E55.9 VITAMIN D DEFICIENCY: ICD-10-CM

## 2024-08-02 DIAGNOSIS — Z00.00 WELLNESS EXAMINATION: ICD-10-CM

## 2024-08-02 LAB
25(OH)D3 SERPL-MCNC: 65 NG/ML (ref 30–100)
ALBUMIN SERPL BCP-MCNC: 4.1 G/DL (ref 3.4–5)
ALP SERPL-CCNC: 67 U/L (ref 33–110)
ALT SERPL W P-5'-P-CCNC: 13 U/L (ref 7–45)
ANION GAP SERPL CALC-SCNC: 12 MMOL/L (ref 10–20)
AST SERPL W P-5'-P-CCNC: 14 U/L (ref 9–39)
BILIRUB SERPL-MCNC: 0.5 MG/DL (ref 0–1.2)
BUN SERPL-MCNC: 17 MG/DL (ref 6–23)
CALCIUM SERPL-MCNC: 9.5 MG/DL (ref 8.6–10.6)
CHLORIDE SERPL-SCNC: 107 MMOL/L (ref 98–107)
CHOLEST SERPL-MCNC: 209 MG/DL (ref 0–199)
CHOLESTEROL/HDL RATIO: 3.7
CO2 SERPL-SCNC: 28 MMOL/L (ref 21–32)
CREAT SERPL-MCNC: 0.72 MG/DL (ref 0.5–1.05)
EGFRCR SERPLBLD CKD-EPI 2021: >90 ML/MIN/1.73M*2
ERYTHROCYTE [DISTWIDTH] IN BLOOD BY AUTOMATED COUNT: 13.6 % (ref 11.5–14.5)
GLUCOSE SERPL-MCNC: 81 MG/DL (ref 74–99)
HCT VFR BLD AUTO: 40.4 % (ref 36–46)
HDLC SERPL-MCNC: 56.9 MG/DL
HGB BLD-MCNC: 12.9 G/DL (ref 12–16)
LDLC SERPL CALC-MCNC: 138 MG/DL
MCH RBC QN AUTO: 28.5 PG (ref 26–34)
MCHC RBC AUTO-ENTMCNC: 31.9 G/DL (ref 32–36)
MCV RBC AUTO: 89 FL (ref 80–100)
NON HDL CHOLESTEROL: 152 MG/DL (ref 0–149)
NRBC BLD-RTO: 0 /100 WBCS (ref 0–0)
PLATELET # BLD AUTO: 291 X10*3/UL (ref 150–450)
POTASSIUM SERPL-SCNC: 4.6 MMOL/L (ref 3.5–5.3)
PROT SERPL-MCNC: 6 G/DL (ref 6.4–8.2)
RBC # BLD AUTO: 4.53 X10*6/UL (ref 4–5.2)
SODIUM SERPL-SCNC: 142 MMOL/L (ref 136–145)
TRIGL SERPL-MCNC: 72 MG/DL (ref 0–149)
VLDL: 14 MG/DL (ref 0–40)
WBC # BLD AUTO: 4.6 X10*3/UL (ref 4.4–11.3)

## 2024-08-02 PROCEDURE — 80053 COMPREHEN METABOLIC PANEL: CPT

## 2024-08-02 PROCEDURE — 82306 VITAMIN D 25 HYDROXY: CPT

## 2024-08-02 PROCEDURE — 36415 COLL VENOUS BLD VENIPUNCTURE: CPT

## 2024-08-02 PROCEDURE — 85027 COMPLETE CBC AUTOMATED: CPT

## 2024-08-02 PROCEDURE — 80061 LIPID PANEL: CPT

## 2024-08-14 ENCOUNTER — APPOINTMENT (OUTPATIENT)
Dept: PRIMARY CARE | Facility: CLINIC | Age: 59
End: 2024-08-14
Payer: COMMERCIAL

## 2024-09-11 ENCOUNTER — APPOINTMENT (OUTPATIENT)
Dept: PRIMARY CARE | Facility: CLINIC | Age: 59
End: 2024-09-11
Payer: COMMERCIAL

## 2024-11-13 ENCOUNTER — ANESTHESIA (OUTPATIENT)
Dept: OPERATING ROOM | Facility: CLINIC | Age: 59
End: 2024-11-13
Payer: COMMERCIAL

## 2024-11-13 ENCOUNTER — HOSPITAL ENCOUNTER (OUTPATIENT)
Dept: OPERATING ROOM | Facility: CLINIC | Age: 59
Discharge: HOME | End: 2024-11-13
Payer: COMMERCIAL

## 2024-11-13 ENCOUNTER — ANESTHESIA EVENT (OUTPATIENT)
Dept: OPERATING ROOM | Facility: CLINIC | Age: 59
End: 2024-11-13
Payer: COMMERCIAL

## 2024-11-13 VITALS
HEART RATE: 71 BPM | OXYGEN SATURATION: 99 % | WEIGHT: 131.61 LBS | HEIGHT: 63 IN | DIASTOLIC BLOOD PRESSURE: 67 MMHG | TEMPERATURE: 97.2 F | BODY MASS INDEX: 23.32 KG/M2 | RESPIRATION RATE: 18 BRPM | SYSTOLIC BLOOD PRESSURE: 113 MMHG

## 2024-11-13 DIAGNOSIS — Z12.12 SCREENING FOR COLORECTAL CANCER: ICD-10-CM

## 2024-11-13 DIAGNOSIS — Z12.11 SCREENING FOR COLORECTAL CANCER: ICD-10-CM

## 2024-11-13 PROCEDURE — A45378 PR COLONOSCOPY,DIAGNOSTIC: Performed by: ANESTHESIOLOGY

## 2024-11-13 PROCEDURE — A45378 PR COLONOSCOPY,DIAGNOSTIC: Performed by: ANESTHESIOLOGIST ASSISTANT

## 2024-11-13 PROCEDURE — 2500000005 HC RX 250 GENERAL PHARMACY W/O HCPCS: Performed by: INTERNAL MEDICINE

## 2024-11-13 PROCEDURE — 3600000007 HC OR TIME - EACH INCREMENTAL 1 MINUTE - PROCEDURE LEVEL TWO: Performed by: ANESTHESIOLOGY

## 2024-11-13 PROCEDURE — 2500000004 HC RX 250 GENERAL PHARMACY W/ HCPCS (ALT 636 FOR OP/ED): Performed by: ANESTHESIOLOGIST ASSISTANT

## 2024-11-13 PROCEDURE — 3700000002 HC GENERAL ANESTHESIA TIME - EACH INCREMENTAL 1 MINUTE: Performed by: ANESTHESIOLOGY

## 2024-11-13 PROCEDURE — 7100000009 HC PHASE TWO TIME - INITIAL BASE CHARGE: Performed by: ANESTHESIOLOGY

## 2024-11-13 PROCEDURE — 3600000002 HC OR TIME - INITIAL BASE CHARGE - PROCEDURE LEVEL TWO: Performed by: ANESTHESIOLOGY

## 2024-11-13 PROCEDURE — 45378 DIAGNOSTIC COLONOSCOPY: CPT | Performed by: INTERNAL MEDICINE

## 2024-11-13 PROCEDURE — 7100000010 HC PHASE TWO TIME - EACH INCREMENTAL 1 MINUTE: Performed by: ANESTHESIOLOGY

## 2024-11-13 PROCEDURE — 3700000001 HC GENERAL ANESTHESIA TIME - INITIAL BASE CHARGE: Performed by: ANESTHESIOLOGY

## 2024-11-13 RX ORDER — PROPOFOL 10 MG/ML
INJECTION, EMULSION INTRAVENOUS CONTINUOUS PRN
Status: DISCONTINUED | OUTPATIENT
Start: 2024-11-13 | End: 2024-11-13

## 2024-11-13 RX ORDER — METOCLOPRAMIDE HYDROCHLORIDE 5 MG/ML
10 INJECTION INTRAMUSCULAR; INTRAVENOUS ONCE AS NEEDED
Status: DISCONTINUED | OUTPATIENT
Start: 2024-11-13 | End: 2024-11-14 | Stop reason: HOSPADM

## 2024-11-13 RX ORDER — LIDOCAINE HYDROCHLORIDE 20 MG/ML
INJECTION, SOLUTION INFILTRATION; PERINEURAL AS NEEDED
Status: DISCONTINUED | OUTPATIENT
Start: 2024-11-13 | End: 2024-11-13

## 2024-11-13 RX ORDER — LABETALOL HYDROCHLORIDE 5 MG/ML
5 INJECTION, SOLUTION INTRAVENOUS ONCE AS NEEDED
Status: DISCONTINUED | OUTPATIENT
Start: 2024-11-13 | End: 2024-11-14 | Stop reason: HOSPADM

## 2024-11-13 RX ORDER — LIDOCAINE HYDROCHLORIDE 10 MG/ML
0.1 INJECTION, SOLUTION EPIDURAL; INFILTRATION; INTRACAUDAL; PERINEURAL ONCE
Status: DISCONTINUED | OUTPATIENT
Start: 2024-11-13 | End: 2024-11-14 | Stop reason: HOSPADM

## 2024-11-13 RX ORDER — WATER 1 ML/ML
IRRIGANT IRRIGATION AS NEEDED
Status: COMPLETED | OUTPATIENT
Start: 2024-11-13 | End: 2024-11-13

## 2024-11-13 RX ORDER — ONDANSETRON HYDROCHLORIDE 2 MG/ML
4 INJECTION, SOLUTION INTRAVENOUS ONCE AS NEEDED
Status: DISCONTINUED | OUTPATIENT
Start: 2024-11-13 | End: 2024-11-14 | Stop reason: HOSPADM

## 2024-11-13 RX ORDER — OXYCODONE HYDROCHLORIDE 5 MG/1
5 TABLET ORAL EVERY 4 HOURS PRN
Status: DISCONTINUED | OUTPATIENT
Start: 2024-11-13 | End: 2024-11-14 | Stop reason: HOSPADM

## 2024-11-13 SDOH — HEALTH STABILITY: MENTAL HEALTH: CURRENT SMOKER: 0

## 2024-11-13 ASSESSMENT — PAIN SCALES - GENERAL
PAINLEVEL_OUTOF10: 0 - NO PAIN
PAINLEVEL_OUTOF10: 0 - NO PAIN
PAIN_LEVEL: 0
PAINLEVEL_OUTOF10: 0 - NO PAIN

## 2024-11-13 ASSESSMENT — ENCOUNTER SYMPTOMS
JOINT SWELLING: 0
DIZZINESS: 0
SHORTNESS OF BREATH: 0
CONFUSION: 0
FEVER: 0
VOMITING: 0
WHEEZING: 0
DIFFICULTY URINATING: 0
NAUSEA: 0
COLOR CHANGE: 0
DIARRHEA: 0
SPEECH DIFFICULTY: 0
LIGHT-HEADEDNESS: 0
SLEEP DISTURBANCE: 0
HEADACHES: 0
ARTHRALGIAS: 0
ABDOMINAL DISTENTION: 0
TROUBLE SWALLOWING: 0
CHILLS: 0
COUGH: 0
ABDOMINAL PAIN: 0
UNEXPECTED WEIGHT CHANGE: 0
CONSTIPATION: 0

## 2024-11-13 ASSESSMENT — PAIN - FUNCTIONAL ASSESSMENT
PAIN_FUNCTIONAL_ASSESSMENT: 0-10
PAIN_FUNCTIONAL_ASSESSMENT: 0-10

## 2024-11-13 ASSESSMENT — COLUMBIA-SUICIDE SEVERITY RATING SCALE - C-SSRS
2. HAVE YOU ACTUALLY HAD ANY THOUGHTS OF KILLING YOURSELF?: NO
1. IN THE PAST MONTH, HAVE YOU WISHED YOU WERE DEAD OR WISHED YOU COULD GO TO SLEEP AND NOT WAKE UP?: NO
6. HAVE YOU EVER DONE ANYTHING, STARTED TO DO ANYTHING, OR PREPARED TO DO ANYTHING TO END YOUR LIFE?: NO

## 2024-11-13 NOTE — DISCHARGE INSTRUCTIONS
. During the first 24 hours after your procedure, you should:    - Resume normal diet, unless otherwise directed by your doctor.  - Resume your home medications, unless otherwise directed by your doctor.  - Refrain from driving or operative heavy machinery.  - Drink plenty of liquids.  - Avoid consuming alcohol.  - Avoid strenuous activity or heavy lifting.    After 24 hours, you can resume regular activity.    Call your doctor office immediately ([phone]) or come to the nearest emergency room if you experience:    - Abdominal tenderness  - Blood in your stool or vomit  - Difficulty urinating or passing stools  - Difficulty breathing  - Chest pain  - Fever       If you experience any problems or have any questions following discharge from the GI Lab, please call:   Dr. Couch [phone].   To reach your physician after hours call [phone] and ask for the GI physician on call.

## 2024-11-13 NOTE — ANESTHESIA POSTPROCEDURE EVALUATION
Patient: Donna Alcantara    Procedure Summary       Date: 11/13/24 Room / Location: [facility] ASC OR    Anesthesia Start: 0900 Anesthesia Stop: 0922    Procedure: COLONOSCOPY Diagnosis: Screening for colorectal cancer    Scheduled Providers: Pop Couch MD Responsible Provider: Geovanny Dejesus MD    Anesthesia Type: MAC ASA Status: 1            Anesthesia Type: MAC    Vitals Value Taken Time   /67 11/13/24 0950   Temp 36.2 °C (97.2 °F) 11/13/24 0950   Pulse 71 11/13/24 0950   Resp 18 11/13/24 0950   SpO2 99 % 11/13/24 0950       Anesthesia Post Evaluation    Patient location during evaluation: PACU  Patient participation: complete - patient participated  Level of consciousness: awake and alert  Pain score: 0  Pain management: adequate  Airway patency: patent  Cardiovascular status: stable and acceptable  Respiratory status: acceptable  Hydration status: acceptable  Postoperative Nausea and Vomiting: none    There were no known notable events for this encounter.

## 2024-11-13 NOTE — ANESTHESIA PREPROCEDURE EVALUATION
Patient: Donna Alcantara    Procedure Information       Date/Time: 11/13/24 0840    Scheduled providers: Pop Couch MD    Procedure: COLONOSCOPY    Location: [facility] OR            Relevant Problems   No relevant active problems       Clinical information reviewed:   Tobacco  Allergies  Meds   Med Hx  Surg Hx  OB Status  Fam Hx  Soc   Hx        NPO Detail:  NPO/Void Status  Date of Last Liquid: 11/13/24  Time of Last Liquid: 0530  Date of Last Solid: 11/11/24  Time of Last Solid: 1900  Last Intake Type: Clear fluids         Physical Exam    Airway  Mallampati: III  TM distance: >3 FB     Cardiovascular - normal exam  Rhythm: regular  Rate: normal     Dental - normal exam     Pulmonary - normal exam  Breath sounds clear to auscultation     Abdominal - normal exam         Anesthesia Plan    History of general anesthesia?: yes  History of complications of general anesthesia?: no    ASA 1     MAC     The patient is not a current smoker.    intravenous induction   Anesthetic plan and risks discussed with patient.    Plan discussed with CRNA and CAA.

## 2025-03-19 NOTE — PROGRESS NOTES
Donna Alcantara female   1965 59 y.o.   [id number]      Chief Complaint  High risk surveillance care, annual mammogram and exam    History Of Present Illness  Donna Alcantara is a very pleasant 59 year old  woman [facility] risk surveillance care. She has family history of breast cancer in her mother, age 53, BRCA negative, sister, age 56 and paternal aunt. She had a bilateral breast reduction and lift in 2022, recovered well. She is pleased with the outcome. She has a history of right breast core biopsy, benign in 2018 and history of left breast core biopsy in 2023, benign and concordant. She presents today for annual mammogram and exam. She denies any new masses or lumps.      BREAST IMAGIN2024 Bilateral Fast MRI, indicates BI-RADS Category 1. 2023 Bilateral screening mammogram, indicates BI-RADS Category 2.      FEMALE HISTORY: menarche age 13, nulliparous, OCP's x 35 years, natural menopause age 49, no HRT, scattered fibroglandular tissue     FAMILY CANCER HISTORY:  Mother: Breast cancer, age 53, negative genetics  Sister: Breast cancer, age 56, NO genetics   Paternal Aunt (1): Breast cancer  Paternal Aunt (2): Ovarian cancer     Surgical History  She has a past surgical history that includes Tonsillectomy (2017); Appendectomy (2017); Breast surgery (2022); Dell tooth extraction; Skin biopsy (Right); Breast biopsy (2023); and Reduction mammaplasty (12).     Social History  She reports that she quit smoking about 19 years ago. Her smoking use included cigarettes. She started smoking about 34 years ago. She has a 15 pack-year smoking history. She has never used smokeless tobacco. She reports current alcohol use of about 8.0 standard drinks of alcohol per week. She reports that she does not use drugs.    Family History  Family History   Problem Relation Name Age of Onset    Hypertension Mother Ramandeep     Breast cancer  Mother Ramandeep     Uterine cancer Mother Ramandeep     Osteoporosis Mother Ramandeep     Arthritis Mother Ramandeep     Colon polyps Mother Ramandeep     Breast cancer Sister Pauline hernandez     Alcohol abuse Mother's Brother Michael     Colon cancer Mother's Brother Michael     Diabetes Maternal Grandmother Grandma     Breast cancer Maternal Cousin          Allergies  Erythromycin base    Medications  Current Outpatient Medications   Medication Instructions    calcium carbonate (CALCIUM 500 ORAL) Take by mouth.    cholecalciferol, vitamin D3, (VITAMIN D3 ORAL) Take by mouth.    glucosam-chondroitin-diet cb25 116-100 mg capsule Take by mouth.    pantoprazole (PROTONIX) 40 mg, oral, Daily, Do not crush, chew, or split.    vitamin E 180 mg (400 unit) capsule Daily RT         REVIEW OF SYSTEMS    Constitutional:  Negative for appetite change, fatigue, fever and unexpected weight change.   HENT:  Negative for ear pain, hearing loss, nosebleeds, sore throat and trouble swallowing.    Eyes:  Negative for discharge, itching and visual disturbance.   Respiratory:  Negative for cough, chest tightness and shortness of breath.    Cardiovascular:  Negative for chest pain, palpitations and leg swelling.   Breast: as indicated in HPI  Gastrointestinal:  Negative for abdominal pain, constipation, diarrhea and nausea.   Endocrine: Negative for cold intolerance and heat intolerance.   Genitourinary:  Negative for dysuria, frequency, hematuria, pelvic pain and vaginal bleeding.   Musculoskeletal:  Negative for arthralgias, back pain, gait problem, joint swelling and myalgias.   Skin:  Negative for color change and rash.   Allergic/Immunologic: Negative for environmental allergies and food allergies.   Neurological:  Negative for dizziness, tremors, speech difficulty, weakness, numbness and headaches.   Hematological:  Does not bruise/bleed easily.   Psychiatric/Behavioral:  Negative for agitation, dysphoric mood and sleep disturbance. The patient is not  nervous/anxious.         Past Medical History  She has a past medical history of Abnormal findings on diagnostic imaging of other specified body structures (04/16/2020), Acute eczematoid otitis externa, unspecified ear (12/03/2018), Allergic, Anemia, Arthritis, Contact with and (suspected) exposure to covid-19 (12/08/2020), Encounter for immunization (03/29/2018), Encounter for immunization (12/26/2019), Encounter for other screening for malignant neoplasm of breast (05/29/2018), Encounter for screening for malignant neoplasm of cervix (05/29/2018), Encounter for screening for other viral diseases, Fibrocystic breast, Irritable bowel syndrome (2004), Low back pain, unspecified (07/23/2021), Motion sickness (1969), Other abnormal and inconclusive findings on diagnostic imaging of breast (12/05/2018), Other specified disorders of eustachian tube, right ear (12/03/2018), Other specified disorders of nose and nasal sinuses (03/22/2017), Other specified noninflammatory disorders of vagina (06/20/2019), Pain in right shoulder (11/12/2020), Peptic ulceration, Personal history of other benign neoplasm (03/21/2017), Personal history of other diseases of the musculoskeletal system and connective tissue (03/21/2017), Personal history of other diseases of the musculoskeletal system and connective tissue (10/16/2019), Personal history of other diseases of urinary system (01/17/2020), Personal history of other medical treatment (07/10/2019), Personal history of other specified conditions (02/15/2021), Personal history of other specified conditions (07/23/2021), Personal history of other specified conditions (03/29/2018), Personal history of other specified conditions (01/23/2020), Personal history of pneumonia (recurrent) (12/26/2019), Personal history of urinary (tract) infections (11/02/2017), Postmenopausal atrophic vaginitis (03/31/2022), Psoriasis, unspecified (03/21/2017), Sprain of left acromioclavicular joint, initial  encounter (01/17/2020), Unspecified injury of muscle(s) and tendon(s) of the rotator cuff of right shoulder, initial encounter (09/29/2020), Unspecified sprain of unspecified finger, initial encounter (01/17/2020), and Varicella.     Physical Exam  Patient is alert and oriented x3 and in a relaxed and appropriate mood. Her gait is steady and hand grasps are equal. Sclera is clear. The breasts are nearly symmetrical. The tissue is dense in the superior lateral quadrants and softer below without palpable abnormalities, discrete nodules or masses. Both breasts have well-healed lollipop incisions. The skin and nipples appear normal. There is no cervical, supraclavicular or axillary lymphadenopathy. Heart rate and rhythm normal, S1 and S2 appreciated. The lungs are clear to auscultation bilaterally.       Physical Exam  Chest:              Last Recorded Vitals  Vitals:    04/03/25 1316   BP: 112/76   Pulse: 74   Temp: 36.1 °C (97 °F)   SpO2: 98%         Relevant Results   Time was spent viewing digital images of the radiology testing with the patient, results pending    Assessment/Plan   High risk surveillance care, normal clinical exam, screening mammogram, family history of breast cancer, scattered fibroglandular tissue, history of bilateral core biopsies, benign, history of bilateral breast reduction and lift    Plan: Return in one year for bilateral screening mammogram and office visit. Full MRI in October 2025. Endocrine therapy discussed and recommended, declines at this time.     Patient Discussion/Summary  Your clinical examination is normal. Please return in one year for bilateral screening mammogram and office visit or sooner if you have any problems or concerns.     High risk breast surveillance care plan:  Yearly mammogram with digital breast tomosynthesis  Twice yearly clinical breast examinations  Breast MRI- Full MRI in October 2025  Monthly self breast examinations  Vitamin D3 2000 IU/daily (over the  counter)  Exercise 3-4 times per week for 45-60 minutes  Limit alcohol to 3-4 drinks per week   Eat a healthy low-fat diet with lots of fruits and vegetables  Risk model indicates you are eligible for endocrine therapy with Tamoxifen, Raloxifene or Exemestane. Endocrine therapy reduces lifetime risk of breast cancer by 50% when taken for 5 years.    You can see your health information, review clinical summaries from office visits & test results online when you follow your health with MY  Chart, a personal health record. To sign up go to [email or web address]. If you need assistance with signing up or trouble getting into your account call [employer] Patient Line 24/7 at [phone].    My office phone number is [phone] if you need to get in touch with me or have additional questions or concerns. Thank you for choosing [facility] and trusting me as your healthcare provider. I look forward to seeing you again at your next office visit. I am honored to be a provider on your health care team and I remain dedicated to helping you achieve your health goals.      Krystal Parham, APRN-CNP

## 2025-04-03 ENCOUNTER — OFFICE VISIT (OUTPATIENT)
Dept: SURGICAL ONCOLOGY | Facility: CLINIC | Age: 60
End: 2025-04-03
Payer: COMMERCIAL

## 2025-04-03 ENCOUNTER — HOSPITAL ENCOUNTER (OUTPATIENT)
Dept: RADIOLOGY | Facility: CLINIC | Age: 60
Discharge: HOME | End: 2025-04-03
Payer: COMMERCIAL

## 2025-04-03 VITALS — HEIGHT: 63 IN | BODY MASS INDEX: 23.32 KG/M2 | WEIGHT: 131.61 LBS

## 2025-04-03 VITALS
OXYGEN SATURATION: 98 % | HEART RATE: 74 BPM | WEIGHT: 129 LBS | SYSTOLIC BLOOD PRESSURE: 112 MMHG | TEMPERATURE: 97 F | DIASTOLIC BLOOD PRESSURE: 76 MMHG | BODY MASS INDEX: 22.86 KG/M2

## 2025-04-03 DIAGNOSIS — Z12.31 ENCOUNTER FOR SCREENING MAMMOGRAM FOR MALIGNANT NEOPLASM OF BREAST: ICD-10-CM

## 2025-04-03 DIAGNOSIS — Z12.39 BREAST CANCER SCREENING, HIGH RISK PATIENT: Primary | ICD-10-CM

## 2025-04-03 PROCEDURE — 77067 SCR MAMMO BI INCL CAD: CPT

## 2025-04-03 PROCEDURE — 99213 OFFICE O/P EST LOW 20 MIN: CPT | Performed by: NURSE PRACTITIONER

## 2025-04-03 PROCEDURE — 77063 BREAST TOMOSYNTHESIS BI: CPT | Mod: BILATERAL PROCEDURE | Performed by: RADIOLOGY

## 2025-04-03 PROCEDURE — 1036F TOBACCO NON-USER: CPT | Performed by: NURSE PRACTITIONER

## 2025-04-03 PROCEDURE — 77067 SCR MAMMO BI INCL CAD: CPT | Mod: BILATERAL PROCEDURE | Performed by: RADIOLOGY

## 2025-04-03 ASSESSMENT — PAIN SCALES - GENERAL: PAINLEVEL_OUTOF10: 0-NO PAIN

## 2025-04-03 ASSESSMENT — PATIENT HEALTH QUESTIONNAIRE - PHQ9
1. LITTLE INTEREST OR PLEASURE IN DOING THINGS: NOT AT ALL
SUM OF ALL RESPONSES TO PHQ9 QUESTIONS 1 & 2: 0
2. FEELING DOWN, DEPRESSED OR HOPELESS: NOT AT ALL

## 2025-04-03 NOTE — PATIENT INSTRUCTIONS
Your clinical examination is normal. Please return in one year for bilateral screening mammogram and office visit or sooner if you have any problems or concerns.     High risk breast surveillance care plan:  Yearly mammogram with digital breast tomosynthesis  Twice yearly clinical breast examinations  Breast MRI- Full MRI in October 2025  Monthly self breast examinations  Vitamin D3 2000 IU/daily (over the counter)  Exercise 3-4 times per week for 45-60 minutes  Limit alcohol to 3-4 drinks per week   Eat a healthy low-fat diet with lots of fruits and vegetables  Risk model indicates you are eligible for endocrine therapy with Tamoxifen, Raloxifene or Exemestane. Endocrine therapy reduces lifetime risk of breast cancer by 50% when taken for 5 years.    You can see your health information, review clinical summaries from office visits & test results online when you follow your health with MY  Chart, a personal health record. To sign up go to [email or web address]. If you need assistance with signing up or trouble getting into your account call "[employer] Patient Line 24/7 at [phone].    My office phone number is [phone] if you need to get in touch with me or have additional questions or concerns. Thank you for choosing [facility] and trusting me as your healthcare provider. I look forward to seeing you again at your next office visit. I am honored to be a provider on your health care team and I remain dedicated to helping you achieve your health goals.

## 2025-04-07 DIAGNOSIS — R92.8 ABNORMAL MAMMOGRAM: Primary | ICD-10-CM

## 2025-04-10 ENCOUNTER — HOSPITAL ENCOUNTER (OUTPATIENT)
Dept: RADIOLOGY | Facility: CLINIC | Age: 60
Discharge: HOME | End: 2025-04-10
Payer: COMMERCIAL

## 2025-04-10 DIAGNOSIS — R92.8 ABNORMAL MAMMOGRAM: ICD-10-CM

## 2025-04-10 PROCEDURE — 77062 BREAST TOMOSYNTHESIS BI: CPT

## 2025-07-03 ENCOUNTER — ANCILLARY PROCEDURE (OUTPATIENT)
Dept: URGENT CARE | Age: 60
End: 2025-07-03
Payer: COMMERCIAL

## 2025-07-03 ENCOUNTER — OFFICE VISIT (OUTPATIENT)
Dept: URGENT CARE | Age: 60
End: 2025-07-03
Payer: COMMERCIAL

## 2025-07-03 VITALS
OXYGEN SATURATION: 99 % | HEIGHT: 63 IN | WEIGHT: 121 LBS | BODY MASS INDEX: 21.44 KG/M2 | SYSTOLIC BLOOD PRESSURE: 123 MMHG | DIASTOLIC BLOOD PRESSURE: 83 MMHG | HEART RATE: 71 BPM | TEMPERATURE: 98.2 F | RESPIRATION RATE: 16 BRPM

## 2025-07-03 DIAGNOSIS — R07.81 RIB PAIN ON RIGHT SIDE: ICD-10-CM

## 2025-07-03 PROCEDURE — 71101 X-RAY EXAM UNILAT RIBS/CHEST: CPT | Mod: RIGHT SIDE | Performed by: PHYSICIAN ASSISTANT

## 2025-07-03 ASSESSMENT — ENCOUNTER SYMPTOMS
COUGH: 0
SHORTNESS OF BREATH: 0
NAUSEA: 0
ABDOMINAL PAIN: 0
FEVER: 0
JOINT SWELLING: 0
CHEST TIGHTNESS: 0
VOMITING: 0
CHILLS: 0
FATIGUE: 0
DIARRHEA: 0

## 2025-07-03 ASSESSMENT — PAIN SCALES - GENERAL: PAINLEVEL_OUTOF10: 6

## 2025-07-03 NOTE — PROGRESS NOTES
"Subjective   Patient ID: Donna Alcantara is a 59 y.o. female. They present today with a chief complaint of Rib Injury.    History of Present Illness  Pt presented with acute right side rib pain under her breast. States fell 2 days ago and pain started after injury. Pain worse with movement and deep breathing. Denies coughing blood, SOB, chest pain or tightness. Denies injury to head.           Past Medical History  Allergies as of 07/03/2025 - Reviewed 07/03/2025   Allergen Reaction Noted    Erythromycin base Palpitations 05/03/2023       Prescriptions Prior to Admission[1]     Medical History[2]    Surgical History[3]     reports that she quit smoking about 19 years ago. Her smoking use included cigarettes. She started smoking about 34 years ago. She has a 15 pack-year smoking history. She has never used smokeless tobacco. She reports current alcohol use of about 8.0 standard drinks of alcohol per week. She reports that she does not use drugs.    Review of Systems  Review of Systems   Constitutional:  Negative for chills, fatigue and fever.   Respiratory:  Negative for cough, chest tightness and shortness of breath.    Cardiovascular:  Negative for chest pain.   Gastrointestinal:  Negative for abdominal pain, diarrhea, nausea and vomiting.   Musculoskeletal:  Negative for joint swelling.        Rib pain                                  Objective    Vitals:    07/03/25 0911   BP: 123/83   Pulse: 71   Resp: 16   Temp: 36.8 °C (98.2 °F)   TempSrc: Oral   SpO2: 99%   Weight: 54.9 kg (121 lb)   Height: 1.6 m (5' 3\")     No LMP recorded (lmp unknown). Patient is postmenopausal.    Physical Exam  Constitutional:       Appearance: Normal appearance.   HENT:      Head: Normocephalic and atraumatic.      Nose: Nose normal.   Cardiovascular:      Rate and Rhythm: Normal rate and regular rhythm.      Heart sounds: No murmur heard.  Pulmonary:      Effort: Pulmonary effort is normal.      Breath sounds: Normal breath sounds. "   Abdominal:      General: Abdomen is flat.      Palpations: Abdomen is soft.   Musculoskeletal:         General: Tenderness and signs of injury present. No swelling. Normal range of motion.      Comments: Tenderness on right side chest below breast w/o bruising/swelling/open wound   Skin:     General: Skin is warm and dry.      Findings: No bruising, erythema or lesion.   Neurological:      Mental Status: She is alert and oriented to person, place, and time.   Psychiatric:         Mood and Affect: Mood normal.         Procedures    Point of Care Test & Imaging Results from this visit  No results found for this visit on 07/03/25.   Imaging  XR ribs right 2 views w chest pa or ap  Result Date: 7/3/2025  No acute cardiopulmonary process. No right rib fracture.   MACRO: None   Signed by: Mallika Thomas 7/3/2025 9:53 AM Dictation workstation:   [id number]      Cardiology, Vascular, and Other Imaging  No other imaging results found for the past 2 days      Diagnostic study results (if any) were reviewed by Aubrie Zheng PA-C.    Assessment/Plan   Allergies, medications, history, and pertinent labs/EKGs/Imaging reviewed by Aubrie Zheng PA-C.     Medical Decision Making  Acute rib pain related to injury  X-ray no acute findings  Suspicious for soft tissue injury  Recommended NSAIDS/Rest      Orders and Diagnoses  Diagnoses and all orders for this visit:  Rib pain on right side      Medical Admin Record      Patient disposition: Home    Electronically signed by Aubrie Zheng PA-C  11:12 AM           [1] (Not in a hospital admission)   [2]   Past Medical History:  Diagnosis Date    Abnormal findings on diagnostic imaging of other specified body structures 04/16/2020    Abnormal finding on imaging    Acute eczematoid otitis externa, unspecified ear 12/03/2018    Dermatitis of ear canal    Allergic     Anemia     Arthritis     Contact with and (suspected) exposure to covid-19 12/08/2020    Suspected COVID-19 virus infection     Encounter for immunization 03/29/2018    Immunization due    Encounter for immunization 12/26/2019    Immunization due    Encounter for other screening for malignant neoplasm of breast 05/29/2018    Screening for breast cancer    Encounter for screening for malignant neoplasm of cervix 05/29/2018    Screening for cervical cancer    Encounter for screening for other viral diseases     Need for hepatitis C screening test    Fibrocystic breast     Irritable bowel syndrome 2004    Low back pain, unspecified 07/23/2021    Acute left-sided low back pain without sciatica    Motion sickness 1969    Other abnormal and inconclusive findings on diagnostic imaging of breast 12/05/2018    Abnormal MRI, breast    Other specified disorders of eustachian tube, right ear 12/03/2018    Dysfunction of right eustachian tube    Other specified disorders of nose and nasal sinuses 03/22/2017    Irritation, nose    Other specified noninflammatory disorders of vagina 06/20/2019    Vaginal dryness    Pain in right shoulder 11/12/2020    Right shoulder pain    Peptic ulceration     Personal history of other benign neoplasm 03/21/2017    History of lipoma    Personal history of other diseases of the musculoskeletal system and connective tissue 03/21/2017    History of arthritis    Personal history of other diseases of the musculoskeletal system and connective tissue 10/16/2019    History of low back pain    Personal history of other diseases of urinary system 01/17/2020    History of hematuria    Personal history of other medical treatment 07/10/2019    History of screening mammography    Personal history of other specified conditions 02/15/2021    History of abdominal pain    Personal history of other specified conditions 07/23/2021    History of urinary frequency    Personal history of other specified conditions 03/29/2018    History of dysuria    Personal history of other specified conditions 01/23/2020    History of epigastric pain     Personal history of pneumonia (recurrent) 12/26/2019    History of community acquired pneumonia    Personal history of urinary (tract) infections 11/02/2017    History of urinary tract infection    Postmenopausal atrophic vaginitis 03/31/2022    Vaginal atrophy    Psoriasis, unspecified 03/21/2017    Scalp psoriasis    Sprain of left acromioclavicular joint, initial encounter 01/17/2020    Sprain of left acromioclavicular ligament, initial encounter    Unspecified injury of muscle(s) and tendon(s) of the rotator cuff of right shoulder, initial encounter 09/29/2020    Injury of right rotator cuff, initial encounter    Unspecified sprain of unspecified finger, initial encounter 01/17/2020    Finger sprain    Varicella    [3]   Past Surgical History:  Procedure Laterality Date    APPENDECTOMY  03/21/2017    Appendectomy    BREAST BIOPSY  9/2023    BREAST SURGERY  12/2022    REDUCTION MAMMAPLASTY  12/1/12    SKIN BIOPSY Right     Skin cancer right thigh    TONSILLECTOMY  03/21/2017    Tonsillectomy    WISDOM TOOTH EXTRACTION

## 2025-09-10 ENCOUNTER — APPOINTMENT (OUTPATIENT)
Dept: PRIMARY CARE | Facility: CLINIC | Age: 60
End: 2025-09-10
Payer: COMMERCIAL

## 2025-09-11 ENCOUNTER — APPOINTMENT (OUTPATIENT)
Dept: PRIMARY CARE | Facility: CLINIC | Age: 60
End: 2025-09-11
Payer: COMMERCIAL

## 2025-09-23 ENCOUNTER — APPOINTMENT (OUTPATIENT)
Dept: PRIMARY CARE | Facility: CLINIC | Age: 60
End: 2025-09-23
Payer: COMMERCIAL